# Patient Record
Sex: FEMALE | Race: ASIAN | NOT HISPANIC OR LATINO | Employment: FULL TIME | URBAN - METROPOLITAN AREA
[De-identification: names, ages, dates, MRNs, and addresses within clinical notes are randomized per-mention and may not be internally consistent; named-entity substitution may affect disease eponyms.]

---

## 2017-05-17 ENCOUNTER — HOSPITAL ENCOUNTER (OUTPATIENT)
Dept: RADIOLOGY | Facility: MEDICAL CENTER | Age: 42
End: 2017-05-17
Attending: SPECIALIST
Payer: COMMERCIAL

## 2017-05-17 DIAGNOSIS — E04.9 ENLARGEMENT OF THYROID: ICD-10-CM

## 2017-05-17 PROCEDURE — 76536 US EXAM OF HEAD AND NECK: CPT

## 2017-06-19 ENCOUNTER — HOSPITAL ENCOUNTER (OUTPATIENT)
Dept: RADIOLOGY | Facility: MEDICAL CENTER | Age: 42
End: 2017-06-19
Attending: SPECIALIST
Payer: COMMERCIAL

## 2017-06-19 DIAGNOSIS — Z12.31 ENCOUNTER FOR MAMMOGRAM TO ESTABLISH BASELINE MAMMOGRAM: ICD-10-CM

## 2017-06-19 PROCEDURE — 77063 BREAST TOMOSYNTHESIS BI: CPT

## 2017-09-28 ENCOUNTER — OFFICE VISIT (OUTPATIENT)
Dept: MEDICAL GROUP | Facility: MEDICAL CENTER | Age: 42
End: 2017-09-28
Payer: COMMERCIAL

## 2017-09-28 ENCOUNTER — PATIENT MESSAGE (OUTPATIENT)
Dept: MEDICAL GROUP | Facility: MEDICAL CENTER | Age: 42
End: 2017-09-28

## 2017-09-28 VITALS
SYSTOLIC BLOOD PRESSURE: 120 MMHG | HEART RATE: 77 BPM | RESPIRATION RATE: 14 BRPM | BODY MASS INDEX: 26.22 KG/M2 | DIASTOLIC BLOOD PRESSURE: 80 MMHG | OXYGEN SATURATION: 98 % | TEMPERATURE: 97.2 F | HEIGHT: 66 IN | WEIGHT: 163.14 LBS

## 2017-09-28 DIAGNOSIS — F41.1 GENERALIZED ANXIETY DISORDER: ICD-10-CM

## 2017-09-28 DIAGNOSIS — M25.511 CHRONIC PAIN OF BOTH SHOULDERS: ICD-10-CM

## 2017-09-28 DIAGNOSIS — G89.29 CHRONIC PAIN OF BOTH SHOULDERS: ICD-10-CM

## 2017-09-28 DIAGNOSIS — R22.0 LUMP ON FACE: ICD-10-CM

## 2017-09-28 DIAGNOSIS — J45.20 MILD INTERMITTENT ASTHMA WITHOUT COMPLICATION: ICD-10-CM

## 2017-09-28 DIAGNOSIS — E78.00 HYPERCHOLESTEROLEMIA: ICD-10-CM

## 2017-09-28 DIAGNOSIS — M25.512 CHRONIC PAIN OF BOTH SHOULDERS: ICD-10-CM

## 2017-09-28 PROCEDURE — 99214 OFFICE O/P EST MOD 30 MIN: CPT | Performed by: FAMILY MEDICINE

## 2017-09-28 RX ORDER — LORAZEPAM 0.5 MG/1
0.5 TABLET ORAL
Qty: 15 TAB | Refills: 0 | Status: SHIPPED | OUTPATIENT
Start: 2017-09-28 | End: 2018-10-01 | Stop reason: SDUPTHER

## 2017-09-28 RX ORDER — FLUOXETINE 20 MG/1
20 TABLET, FILM COATED ORAL DAILY
Qty: 90 TAB | Refills: 3 | Status: SHIPPED | OUTPATIENT
Start: 2017-09-28 | End: 2018-10-01 | Stop reason: SDUPTHER

## 2017-09-28 RX ORDER — ALBUTEROL SULFATE 90 UG/1
2 AEROSOL, METERED RESPIRATORY (INHALATION) EVERY 6 HOURS PRN
Qty: 8.5 G | Refills: 3 | Status: SHIPPED | OUTPATIENT
Start: 2017-09-28 | End: 2021-06-28

## 2017-09-28 ASSESSMENT — PATIENT HEALTH QUESTIONNAIRE - PHQ9: CLINICAL INTERPRETATION OF PHQ2 SCORE: 0

## 2017-09-28 NOTE — ASSESSMENT & PLAN NOTE
This is a chronic health problem for this patient in that she has a history of elevated cholesterol the part of the reason her cholesterol is elevated she has an excellent HDL cholesterol. Her most recent blood work which was done 5/18/17 shows a total cholesterol of 259, tragus rate 72, HDL 87 and her LDL is calculated at 158. Her VLDL is in the normal range of 14. We can get further testing to actually directly measure her LDL cholesterol to determine whether or not she truly needs medication. Patient is working on diet and exercise and would prefer not going on medication. She has convinced herself that she actually is having problems with cholesterol because of having to be back on fluoxetine.

## 2017-09-28 NOTE — ASSESSMENT & PLAN NOTE
This is a chronic health problem for this patient that actually controlled with her being on fluoxetine. She has a history of depression in the past which was treated with the fluoxetine and then she came off the medication. She is doing well now as far as panic attacks being controlled while she is on fluoxetine. She does occasionally use a small portion of a dose of 0.5 mg lorazepam to try and calm herself when she is having excessive right hip pain that then causes her to become anxious. She will be given a prescription for 15 tablets for the coming year. She had a prescription for a similar number in the last year.I did consult the  report which is consistent with her use and no other opiates or the presence are being prescribed.

## 2017-09-28 NOTE — ASSESSMENT & PLAN NOTE
This is a lump that was found by the patient about the beginning of July when she was rubbing her face and rubbing her eyes she noted there was a lump at the nasal bridge on the right-hand side just inside the eye socket itself. It's nontender if she's not touching it. She does not believe it's changed in size over that time. She cannot recall ever finding it previously. She is concerned because she has a history of breast cancer in the family and finding new lumps are upsetting. Patient also shares that she has a sense or feeling of fullness behind her right eye. She states that's been present for 2 weeks. She does not know if it's associated with the lesion under the skin on her face. It is on the same side. We will do a CT of the sinuses to evaluate the entire area. We will notify her of results and have her come in to discuss.

## 2017-09-28 NOTE — ASSESSMENT & PLAN NOTE
This is chronic health problem well controlled with current medications. She occasionally will have to use her albuterol inhalers. She does not take a regular steroid inhaler. Sounds like her symptomatology is fairly simple and she will let us know if she's having problems.

## 2017-09-29 NOTE — PROGRESS NOTES
CC: Discuss chronic health problems listed below.    HISTORY OF PRESENT ILLNESS: Patient is a 42 y.o. female established patient who presents today to [facility] care previously having seen Dr. Kelly Tadeo. She would like to discuss her problems listed below.    Health Maintenance: Completed    Lump on face  This is a lump that was found by the patient about the beginning of July when she was rubbing her face and rubbing her eyes she noted there was a lump at the nasal bridge on the right-hand side just inside the eye socket itself. It's nontender if she's not touching it. She does not believe it's changed in size over that time. She cannot recall ever finding it previously. She is concerned because she has a history of breast cancer in the family and finding new lumps are upsetting. Patient also shares that she has a sense or feeling of fullness behind her right eye. She states that's been present for 2 weeks. She does not know if it's associated with the lesion under the skin on her face. It is on the same side. We will do a CT of the sinuses to evaluate the entire area. We will notify her of results and have her come in to discuss.    Mild intermittent asthma without complication  This is chronic health problem well controlled with current medications. She occasionally will have to use her albuterol inhalers. She does not take a regular steroid inhaler. Sounds like her symptomatology is fairly simple and she will let us know if she's having problems.    Hypercholesterolemia  This is a chronic health problem for this patient in that she has a history of elevated cholesterol the part of the reason her cholesterol is elevated she has an excellent HDL cholesterol. Her most recent blood work which was done 5/18/17 shows a total cholesterol of 259, tragus rate 72, HDL 87 and her LDL is calculated at 158. Her VLDL is in the normal range of 14. We can get further testing to actually directly measure her LDL cholesterol  to determine whether or not she truly needs medication. Patient is working on diet and exercise and would prefer not going on medication. She has convinced herself that she actually is having problems with cholesterol because of having to be back on fluoxetine.    Generalized anxiety disorder   This is a chronic health problem for this patient that actually controlled with her being on fluoxetine. She has a history of depression in the past which was treated with the fluoxetine and then she came off the medication. She is doing well now as far as panic attacks being controlled while she is on fluoxetine. She does occasionally use a small portion of a dose of 0.5 mg lorazepam to try and calm herself when she is having excessive right hip pain that then causes her to become anxious. She will be given a prescription for 15 tablets for the coming year. She had a prescription for a similar number in the last year.I did consult the  report which is consistent with her use and no other opiates or the presence are being prescribed.    Chronic pain of both shoulders  This is a new complaint for the patient. She states that about mid July she was working out doing fairly heavy weight lifting. Because of her hip dysplasia she tries to do regular weight workouts but does not get as much aerobic activity. She notes that since mid July she's having problems with pain in both of her shoulders but particularly of the air movements in her right shoulder that will cause her right arm to experience numbness and radicular pain. She would like to see sports medicine.      Patient Active Problem List    Diagnosis Date Noted   • Lump on face 09/28/2017   • Chronic pain of both shoulders 09/28/2017   • Hypercholesterolemia 10/16/2016   • Eczema 03/17/2014   • Generalized anxiety disorder    • Mild intermittent asthma without complication    • HIP DYSPLASIA, CONGENITAL 08/09/2010      Allergies:Asa [aspirin]; Neomycin-polymixin b  [neomycin-polymyxin b]; and Omnipaque [iohexol]    Current Outpatient Prescriptions   Medication Sig Dispense Refill   • fluoxetine (PROZAC) 20 MG tablet Take 1 Tab by mouth every day. 90 Tab 3   • albuterol 108 (90 Base) MCG/ACT Aero Soln inhalation aerosol Inhale 2 Puffs by mouth every 6 hours as needed for Shortness of Breath. 8.5 g 3   • lorazepam (ATIVAN) 0.5 MG Tab Take 1 Tab by mouth 1 time daily as needed for Anxiety. 15 Tab 0   • asa/apap/caffeine (EXCEDRIN) 250-250-65 MG TABS Take 1 Tab by mouth every 6 hours as needed.         No current facility-administered medications for this visit.        Social History   Substance Use Topics   • Smoking status: Never Smoker   • Smokeless tobacco: Never Used   • Alcohol use No     Social History     Social History Narrative   • No narrative on file       Family History   Problem Relation Age of Onset   • Cancer Mother 51     Breast    • Arthritis Mother      DJD   • Hyperlipidemia Mother    • Cancer Maternal Grandfather 70     prostate   • Stroke Paternal Grandmother      80   • Cancer Paternal Grandmother      Breast, pancreas, bone   • Diabetes Paternal Grandmother      Surgically induced.   • Arthritis Paternal Grandmother      RA   • Heart Disease Paternal Grandfather      MI    • Other Paternal Grandfather      hepatitis - complication   • Hyperlipidemia Father    • Other Maternal Grandmother      old age   • Hyperlipidemia Brother    • Diabetes Paternal Uncle    • Alcohol abuse Paternal Uncle        Review of Systems:      - Constitutional: Negative for fever, chills, unexpected weight change, and fatigue/generalized weakness.     - HEENT: Negative for headaches, vision changes, hearing changes, ear pain, ear discharge, rhinorrhea, sinus congestion, sore throat, and neck pain.      - Respiratory: Negative for cough, sputum production, chest congestion, dyspnea, wheezing, and crackles.      - Cardiovascular: Negative for chest pain, palpitations, orthopnea, and  "bilateral lower extremity edema.     - Gastrointestinal: Negative for heartburn, nausea, vomiting, abdominal pain, hematochezia, melena, diarrhea, constipation, and greasy/foul-smelling stools.     - Genitourinary: Negative for dysuria, polyuria, hematuria, pyuria, urinary urgency, and urinary incontinence.    - Musculoskeletal:Positive for chronic right hip pain dependent upon activity, bilateral shoulder pain as in history of present illness    - Skin: Negative for rash, itching, cyanotic skin color change.     - Neurological: Negative for dizziness, tingling, tremors, focal sensory deficit, focal weakness and headaches.     - Endo/Heme/Allergies: Does not bruise/bleed easily.     - Psychiatric/Behavioral: Negative for depression, suicidal/homicidal ideation and memory loss.        - NOTE: All other systems reviewed and are negative, except as in HPI.      Exam:    Blood pressure 120/80, pulse 77, temperature 36.2 °C (97.2 °F), resp. rate 14, height 1.676 m (5' 6\"), weight 74 kg (163 lb 2.3 oz), last menstrual period 08/29/2017, SpO2 98 %, not currently breastfeeding.  General:  Well nourished, well developed female in NAD  HEENT: Eyes conjunctiva is clear, lids without ptosis, pupils equal round and reactive to light and accommodation.  Ears normal shape and contour, canals are clear bilaterally, TMs with good light reflex and appear normal.  Nasal mucosa pale and edematous with clear rhinorrhea.  Oropharynx benign.  Sinuses (frontal and maxillary) nontender to palpation.  There is a small cystic structure present laterally to the right of the glabella nonmobile and nontender  Head is grossly normal.  Neck: Supple without JVD or bruit. Thyroid is not enlarged.  Pulmonary: Clear to ausculation and percussion.  Normal effort. No rales, ronchi, or wheezing.  Cardiovascular: Regular rate and rhythm without murmur. Carotid and radial pulses are intact and equal bilaterally.  Extremities: no clubbing, cyanosis, or " edema.    Please note that this dictation was created using voice recognition software. I have made every reasonable attempt to correct obvious errors, but I expect that there are errors of grammar and possibly content that I did not discover before finalizing the note.    Assessment/Plan:  1. Lump on face  I'm uncertain as to what this visit may be some type of a brachial cleft cyst, we will get CT of the sinuses to identify  - CT-LOCALIZATION LIMITED SINUSES; Future    2. Generalized anxiety disorder  Controlled with current medications. Patient needing refills  - fluoxetine (PROZAC) 20 MG tablet; Take 1 Tab by mouth every day.  Dispense: 90 Tab; Refill: 3  - lorazepam (ATIVAN) 0.5 MG Tab; Take 1 Tab by mouth 1 time daily as needed for Anxiety.  Dispense: 15 Tab; Refill: 0    3. Mild intermittent asthma without complication  Controlled with current medications  - albuterol 108 (90 Base) MCG/ACT Aero Soln inhalation aerosol; Inhale 2 Puffs by mouth every 6 hours as needed for Shortness of Breath.  Dispense: 8.5 g; Refill: 3    4. Hypercholesterolemia  Uncontrolled, we will get a new blood test to look at her direct LDL and determine how to best treat her elevated cholesterol.      5. Chronic pain of both shoulders  We'll referred to sports medicine

## 2017-09-29 NOTE — ASSESSMENT & PLAN NOTE
This is a new complaint for the patient. She states that about mid July she was working out doing fairly heavy weight lifting. Because of her hip dysplasia she tries to do regular weight workouts but does not get as much aerobic activity. She notes that since mid July she's having problems with pain in both of her shoulders but particularly of the air movements in her right shoulder that will cause her right arm to experience numbness and radicular pain. She would like to see sports medicine.

## 2017-10-06 ENCOUNTER — PATIENT MESSAGE (OUTPATIENT)
Dept: MEDICAL GROUP | Facility: MEDICAL CENTER | Age: 42
End: 2017-10-06

## 2017-10-06 NOTE — TELEPHONE ENCOUNTER
From: Alecia Dejesus  To: Clarissa Butt M.D.  Sent: 10/6/2017 9:02 AM PDT  Subject: RE:Cholesterol test for you    Good morning, Dr. Butt,    I had to cancel my blood test for tomorrow morning because I just learned that [facility]'s lab is not covered by my insurance. [facility], on the other hand, is covered. I would like to go to [facility] [phone], [address] [place] [address]] if at all possible. Is it possible for you to submit the order to them or do I need to stop by your office to  the paperwork?    Thank you,  Alecia Dejesus  ----- Message -----  From: Clarissa Butt M.D.  Sent: 9/29/2017 12:31 PM PDT  To: Alecia Dejesus  Subject: RE:Cholesterol test for you  Good afternoon,  It is best if she fasts for at least 8 hours prior to doing the test to get the most accurate measurement.  Once a get the results I will share them with you.  Thank you,  Dr. YE    ----- Message -----   From: Alecia Dejesus   Sent: 9/29/2017 8:24 AM PDT   To: Clarissa Butt M.D.  Subject: RE:Cholesterol test for you    Good morning, Dr. Romo,    Great! I will take care of this within the next couple of weeks. Do I need to fast for this?    Thank you,  Alecia  ----- Message -----  From: Clarissa Butt M.D.  Sent: 9/28/2017 6:47 PM PDT  To: Alecia Dejesus  Subject: Cholesterol test for you  Lucian Alston,  You can [facility] to any [facility] lab and get the blood test I wanted to get for you. I now have that test in the computer and they can draw your blood and get that for you. Once I get the results I can then have you come in and we can discuss them.  Take care, Dr. Romo

## 2017-10-16 ENCOUNTER — APPOINTMENT (OUTPATIENT)
Dept: RADIOLOGY | Facility: IMAGING CENTER | Age: 42
End: 2017-10-16
Attending: FAMILY MEDICINE
Payer: COMMERCIAL

## 2017-10-16 ENCOUNTER — APPOINTMENT (OUTPATIENT)
Dept: RADIOLOGY | Facility: MEDICAL CENTER | Age: 42
End: 2017-10-16
Attending: FAMILY MEDICINE
Payer: COMMERCIAL

## 2017-10-16 ENCOUNTER — OFFICE VISIT (OUTPATIENT)
Dept: MEDICAL GROUP | Facility: CLINIC | Age: 42
End: 2017-10-16
Payer: COMMERCIAL

## 2017-10-16 VITALS
HEIGHT: 66 IN | HEART RATE: 90 BPM | TEMPERATURE: 98.1 F | RESPIRATION RATE: 18 BRPM | SYSTOLIC BLOOD PRESSURE: 118 MMHG | BODY MASS INDEX: 26.22 KG/M2 | DIASTOLIC BLOOD PRESSURE: 76 MMHG | WEIGHT: 163.14 LBS | OXYGEN SATURATION: 94 %

## 2017-10-16 DIAGNOSIS — M54.12 CERVICAL RADICULOPATHY: ICD-10-CM

## 2017-10-16 DIAGNOSIS — G89.29 CHRONIC RIGHT SHOULDER PAIN: ICD-10-CM

## 2017-10-16 DIAGNOSIS — M25.511 CHRONIC RIGHT SHOULDER PAIN: ICD-10-CM

## 2017-10-16 PROCEDURE — 99203 OFFICE O/P NEW LOW 30 MIN: CPT | Performed by: FAMILY MEDICINE

## 2017-10-16 PROCEDURE — 72040 X-RAY EXAM NECK SPINE 2-3 VW: CPT | Mod: TC | Performed by: FAMILY MEDICINE

## 2017-10-16 NOTE — PROGRESS NOTES
"CHIEF COMPLAINT:  Alecia Dejesus female presenting at the request of Clarissa Butt M.D. for evaluation of Shoulderpain.     Alecia Dejesus is complaining of right shoulder and RIGHT ARM pain  present for 3 months  Pain is at the deltoid region  Quality is sharp  Pain is Non-radiating  Aggravated by movement, overhead activity, reaching back and POSITIVE night symptoms  Improved with  nothing   no prior problems with this shoulder in the past  Prior Treatments: NONE  Prior studies: X-Ray on the neck \"yrs ago\"  Medications tried for pain include: none  Mechanical Symptom history: No Locking and Popping which is painless  POSITIVE RIGHT radiculopathy to \"all her fingers\"  Worse with sitting at desk    REVIEW OF SYSTEMS  No Nausea, No Vomiting, No Chest Pain, No Shortness of Breath, No Dizziness, No Headache    PAST MEDICAL HISTORY:   History reviewed. No pertinent past medical history.    PMH:  has a past medical history of Allergy; ASTHMA; Chronic pain of both shoulders (9/28/2017); CONGENITAL HIP DYSPLASIA; Depression; Hypercholesterolemia (10/16/2016); Lump on face (9/28/2017); Migraine; Panic disorder; and PUD (peptic ulcer disease). She also has no past medical history of Addisons disease (CMS-[facility]); Adrenal disorder (CMS-HCC); Anemia; Arrhythmia; Arthritis; CATARACT; CHF (congestive heart failure) (CMS-HCC); Clotting disorder (CMS-HCC); COPD; Cushings syndrome (CMS-HCC); Diabetes; EMPHYSEMA; GERD (gastroesophageal reflux disease); Glaucoma; Goiter; Heart attack; Heart murmur; HIV (human immunodeficiency virus infection); Hypertension; IBD (inflammatory bowel disease); Kidney disease; Meningitis; Muscle disorder; OSTEOPOROSIS; Parathyroid disorder (CMS-[facility]); Pituitary disease (CMS-HCC); Seizure (CMS-HCC); Stroke (CMS-HCC); Substance abuse; Thyroid disease; Tuberculosis; or Urinary tract infection, site not specified.  MEDS:   Current Outpatient Prescriptions:   •  fluoxetine (PROZAC) 20 MG tablet, Take 1 " "Tab by mouth every day., Disp: 90 Tab, Rfl: 3  •  albuterol 108 (90 Base) MCG/ACT Aero Soln inhalation aerosol, Inhale 2 Puffs by mouth every 6 hours as needed for Shortness of Breath., Disp: 8.5 g, Rfl: 3  •  lorazepam (ATIVAN) 0.5 MG Tab, Take 1 Tab by mouth 1 time daily as needed for Anxiety., Disp: 15 Tab, Rfl: 0  •  asa/apap/caffeine (EXCEDRIN) 250-250-65 MG TABS, Take 1 Tab by mouth every 6 hours as needed.  , Disp: , Rfl:   ALLERGIES:   Allergies   Allergen Reactions   • Asa [Aspirin] Rash     Causes asthma attack   • Neomycin-Polymixin B [Neomycin-Polymyxin B] Rash   • Omnipaque [Iohexol] Rash and Nausea     SURGHX:   Past Surgical History:   Procedure Laterality Date   • HARDWARE REMOVAL ORTHO  2006    right   • ACETABULAR OSTEOTOMY  2005    right   • SALPINGO-OOPHORECTOMY, UNILATERAL  2004    dermoid   • POLYDACTYLY CORRECTION  1980     SOCHX:  reports that she has never smoked. She has never used smokeless tobacco. She reports that she does not drink alcohol or use drugs.  FH: Family history was reviewed, no pertinent findings to report     PHYSICAL EXAM:  /76   Pulse 90   Temp 36.7 °C (98.1 °F)   Resp 18   Ht 1.676 m (5' 6\")   Wt 74 kg (163 lb 2.3 oz)   SpO2 94%   BMI 26.33 kg/m²       well-developed, well-nourished in no apparent distress, alert and oriented x 3.  Gait: normal    Cervical spine:  Range of motion Slightly limited with Lateral rotation  Spurling's testing is POSITIVE with radicular symptoms down the arm past the elbow on the RIGHT in a c6 dermatomal pattern  Cervical spine tenderness POSITIVE at the level of C5    Strength testing:     Deltoid, Bilateral -  5/5  Bicep, Bilateral -  5/5  Tricep, Bilateral -  5/5  Wrist Extension, Bilateral -  5/5  Wrist Flexion, Bilateral -  5/5  Finger Abduction, Bilateral -  5/5    Sensation:  INTACT Bilaterally        Reflexes:   Biceps: R 3+/L  3+  Triceps: R 3+/L  3+  Brachial radialis R 3+/L  3+  Toussaint's testing is Negative " Bilaterally  The arms are otherwise neurovascularly intact     Shoulder Exam:    RIGHT Shoulder:  No visible swelling   Range of motion INTACT  Tenderness: Non-tender  Empty Can Testing Right -  5/5  Internal Rotation Right -  5/5  External Rotation Right -  5/5  Lift Off Testing Right -  5/5  Impingement testing Villatoro  Negative  Neer's testing Negative  Apprehension testing POSITIVE  Relocation testing Negative  Mcguire's Testing POSITIVE  Grind Testing Negative      LEFT Shoulder:  No visible swelling   Range of motion INTACT  Tenderness: Non-tender  Empty Can Testing Left -  5/5  Internal Rotation Left -  5/5  External Rotation Left -  5/5  Lift Off Testing Left -  5/5  Impingement testing Villatoro  Negative  Neer's testing Negative  Apprehension testing POSITIVE  Relocation testing Negative  Mcguire's Testing Negative  Grind Testing Negative    Additional Findings: Flexed Posture      1. Chronic right shoulder pain     2. Cervical radiculopathy  DX-CERVICAL SPINE-2 OR 3 VIEWS    REFERRAL TO PHYSICAL THERAPY Reason for Therapy: Eval/Treat/Report     Long history of right hip issues/congenital, postoperative  POSITIVE right C5-C6 cervical radiculopathy  Likely contributing to her shoulder pain symptoms  Reproducible in the office today  Referral for formal physical therapy for postural training and home exercise program    Should she fail formal physical therapy, consider MRI of the cervical spine for evaluation of her radicular symptoms    Return in about 6 weeks (around 11/27/2017). To see how she is doing formal physical therapy    10/16/2017 2:18 PM    HISTORY/REASON FOR EXAM:  Atraumatic Pain.  Neck pain rating down RIGHT arm.    TECHNIQUE/EXAM DESCRIPTION AND NUMBER OF VIEWS:  Cervical spine series, 3 views.    COMPARISON:  None.      FINDINGS:  Vertebral alignment is preserved.  Mild loss of disc height at the C3-4 C4-5 and C5-6 levels.  Prevertebral soft tissues are within normal limits.  Vertebral body  "heights are preserved.  The facet joints show normal alignment.  Odontoid and lateral masses of C1 are intact.   Impression       1.  Mild degenerative change of cervical spine.  2.  No fracture or subluxation.       taken here and reviewed by me    Thank you Clarissa Butt M.D. for allowing me to participate in caring for your patient.        ADDENDUM:  10/17/17    1. Chronic right shoulder pain     2. Cervical radiculopathy  DX-CERVICAL SPINE-2 OR 3 VIEWS    REFERRAL TO PHYSICAL THERAPY Reason for Therapy: Eval/Treat/Report    CANCELED: REFERRAL TO PHYSICAL THERAPY Reason for Therapy: Eval/Treat/Report     Redirected PT order for insurance reasons as requested by patient via \"[employer] message\"  "

## 2017-10-23 ENCOUNTER — HOSPITAL ENCOUNTER (OUTPATIENT)
Dept: RADIOLOGY | Facility: MEDICAL CENTER | Age: 42
End: 2017-10-23
Attending: FAMILY MEDICINE
Payer: COMMERCIAL

## 2017-10-23 DIAGNOSIS — R22.0 LUMP ON FACE: ICD-10-CM

## 2017-10-23 PROCEDURE — 70486 CT MAXILLOFACIAL W/O DYE: CPT

## 2017-10-24 ENCOUNTER — PATIENT MESSAGE (OUTPATIENT)
Dept: MEDICAL GROUP | Facility: MEDICAL CENTER | Age: 42
End: 2017-10-24

## 2017-10-24 NOTE — TELEPHONE ENCOUNTER
From: Alecia Dejesus  To: Clarissa Butt M.D.  Sent: 10/24/2017 11:29 AM PDT  Subject: Test Result Question    Hello Dr. Butt,    Thank you for reviewing and commenting on my CT scan result. I'm relieved there aren't any issues to be concerned about and that it's just a fat clump. Knowing that the lump isn't an issue, I would like to find out why my vision in my right eye is blurry. Would you recommend an optometrist, ophthalmologist, or someone else? Do you have anyone specific that you'd highly recommend?    Thank you again,  Alecia

## 2017-10-26 LAB
CHOLEST SERPL-MCNC: 250 MG/DL (ref 100–199)
HDL SERPL-SCNC: 35.3 UMOL/L
HDLC SERPL-MCNC: 86 MG/DL
LDL SERPL QN: 21.7 NM
LDL SERPL-SCNC: 1178 NMOL/L
LDL SMALL SERPL-SCNC: <90 NMOL/L
LDLC SERPL CALC-MCNC: 152 MG/DL (ref 0–99)
LP-IR SCORE SERPL: <25
TRIGL SERPL-MCNC: 60 MG/DL (ref 0–149)

## 2018-07-19 ENCOUNTER — HOSPITAL ENCOUNTER (OUTPATIENT)
Dept: RADIOLOGY | Facility: MEDICAL CENTER | Age: 43
End: 2018-07-19
Attending: OTOLARYNGOLOGY
Payer: COMMERCIAL

## 2018-07-19 DIAGNOSIS — D23.30: ICD-10-CM

## 2018-07-19 PROCEDURE — 70486 CT MAXILLOFACIAL W/O DYE: CPT

## 2018-10-01 ENCOUNTER — OFFICE VISIT (OUTPATIENT)
Dept: MEDICAL GROUP | Facility: MEDICAL CENTER | Age: 43
End: 2018-10-01
Payer: COMMERCIAL

## 2018-10-01 VITALS
SYSTOLIC BLOOD PRESSURE: 118 MMHG | DIASTOLIC BLOOD PRESSURE: 68 MMHG | BODY MASS INDEX: 25.65 KG/M2 | TEMPERATURE: 98.8 F | HEART RATE: 84 BPM | RESPIRATION RATE: 14 BRPM | HEIGHT: 66 IN | WEIGHT: 159.6 LBS | OXYGEN SATURATION: 95 %

## 2018-10-01 DIAGNOSIS — Z00.00 WELLNESS EXAMINATION: ICD-10-CM

## 2018-10-01 DIAGNOSIS — E78.00 HYPERCHOLESTEROLEMIA: ICD-10-CM

## 2018-10-01 DIAGNOSIS — F41.1 GENERALIZED ANXIETY DISORDER: ICD-10-CM

## 2018-10-01 DIAGNOSIS — L20.84 INTRINSIC ECZEMA: ICD-10-CM

## 2018-10-01 DIAGNOSIS — Q65.89 CONGENITAL HIP DYSPLASIA: ICD-10-CM

## 2018-10-01 DIAGNOSIS — J45.20 MILD INTERMITTENT ASTHMA WITHOUT COMPLICATION: ICD-10-CM

## 2018-10-01 DIAGNOSIS — R22.0 LUMP ON FACE: ICD-10-CM

## 2018-10-01 PROBLEM — M25.511 CHRONIC PAIN OF BOTH SHOULDERS: Status: RESOLVED | Noted: 2017-09-28 | Resolved: 2018-10-01

## 2018-10-01 PROBLEM — M25.512 CHRONIC PAIN OF BOTH SHOULDERS: Status: RESOLVED | Noted: 2017-09-28 | Resolved: 2018-10-01

## 2018-10-01 PROBLEM — G89.29 CHRONIC PAIN OF BOTH SHOULDERS: Status: RESOLVED | Noted: 2017-09-28 | Resolved: 2018-10-01

## 2018-10-01 PROCEDURE — 99396 PREV VISIT EST AGE 40-64: CPT | Performed by: FAMILY MEDICINE

## 2018-10-01 RX ORDER — LORAZEPAM 0.5 MG/1
0.5 TABLET ORAL
Qty: 15 TAB | Refills: 0 | Status: SHIPPED | OUTPATIENT
Start: 2018-10-01 | End: 2018-12-30

## 2018-10-01 RX ORDER — FLUOXETINE 20 MG/1
20 TABLET, FILM COATED ORAL DAILY
Qty: 90 TAB | Refills: 3 | Status: SHIPPED | OUTPATIENT
Start: 2018-10-01 | End: 2019-09-30 | Stop reason: SDUPTHER

## 2018-10-01 ASSESSMENT — PATIENT HEALTH QUESTIONNAIRE - PHQ9: CLINICAL INTERPRETATION OF PHQ2 SCORE: 0

## 2018-10-01 NOTE — ASSESSMENT & PLAN NOTE
This is a chronic problem that the patient can tell typically will worsen times where she has to take more Advil when she is having worsening migraines or when she has been on her fluoxetine at 20 mg consistently.  She then can adjust her medication doses and take care of the eczema.  She does have some triamcinolone at home that she would like to use up before we write a new prescription.  She only uses that when she has a major flare.

## 2018-10-01 NOTE — PROGRESS NOTES
CC:Diagnoses of Generalized anxiety disorder, Congenital hip dysplasia, Intrinsic eczema, Hypercholesterolemia, Lump on face, Mild intermittent asthma without complication, and Wellness examination were pertinent to this visit.    HISTORY OF PRESENT ILLNESS: Patient is a 43 y.o. female established patient who presents today to have an annual wellness exam as well as to discuss her chronic health problems.  Patient needs refills of medications but is not having any particular problems at this time.  She lives with chronic right hip pain but can affect multiple joints when she starts having problems.  For the most part the patient feels that she is doing very well.  In looking back over her lab work she did a full lipid panel with all the different sized lipid molecules and she does not need that again for 5 years but it would like to get a comprehensive metabolic panel and a CBC because she has not had those since 2015.  We discussed that according to [facility] and health maintenance she is due for a mammogram.  She would like to do a mammogram every other year in her 40s and then start doing them every year in her 50s.  I am in agreement with that it follows the [place] [facility] recommendations.    Health Maintenance: Completed    Congenital hip dysplasia  This is a chronic health problem for this patient that is better since she had her surgery in 2013 at [facility] but she still gets a fair amount of pain.  The hip does stay in the socket which is a big improvement.    Eczema  This is a chronic problem that the patient can tell typically will worsen times where she has to take more Advil when she is having worsening migraines or when she has been on her fluoxetine at 20 mg consistently.  She then can adjust her medication doses and take care of the eczema.  She does have some triamcinolone at home that she would like to use up before we write a new prescription.  She only uses that when she has a major flare.    Generalized  anxiety disorder  This is a chronic health problem for this patient that she is able to manage with taking fluoxetine on a daily basis at 20 mg and then utilizes lorazepam 0.5 mg 1/2-1/4 of a dose as needed for panic attacks.  That usually is brought on times where her right hip is giving her problems and then usually the whole right side of her body ends up starting to have muscle spasms and that can make her extremely stressed.  She is requesting another refill of 15 tablets of her lorazepam which we did last year at the end of September.  This will get her through an entire year.  We will also renew her fluoxetine.  Obtained and reviewed patient utilization report from [facility] [place] pharmacy database on 10/1/2018 1:23 PM  prior to writing prescription for controlled substance II, III or IV per [place] bill . Based on assessment of the report,my physical exam if necessary and the patient's health problem, the prescription is medically necessary.       Hypercholesterolemia  This is a chronic health problem for this patient where she has excellent HDL at 86.  We did a full lipid panel looking at her various LDL molecules and her LDL molecules are the fluffy kind that do not cause heart disease.  We will plan to recheck this at age 47.    Lump on face  Patient has now done to CT sinuses which show that it is stable and not changing.  After seeing an optometrist, ophthalmologist, ENT the thought is is that it may be a bone spur coming off the skull there.  Nothing to be worried about.    Mild intermittent asthma without complication  This is a chronic health problem actually very well controlled with current medications.      Patient Active Problem List    Diagnosis Date Noted   • Wellness examination 10/01/2018   • Lump on face 09/28/2017   • Hypercholesterolemia 10/16/2016   • Eczema 03/17/2014   • Generalized anxiety disorder    • Mild intermittent asthma without complication    • Congenital hip dysplasia  08/09/2010      Allergies:Asa [aspirin]; Neomycin-polymixin b [neomycin-polymyxin b]; and Omnipaque [iohexol]    Current Outpatient Prescriptions   Medication Sig Dispense Refill   • fluoxetine (PROZAC) 20 MG tablet Take 1 Tab by mouth every day. 90 Tab 3   • LORazepam (ATIVAN) 0.5 MG Tab Take 1 Tab by mouth 1 time daily as needed for Anxiety for up to 90 days. 15 Tab 0   • albuterol 108 (90 Base) MCG/ACT Aero Soln inhalation aerosol Inhale 2 Puffs by mouth every 6 hours as needed for Shortness of Breath. 8.5 g 3   • asa/apap/caffeine (EXCEDRIN) 250-250-65 MG TABS Take 1 Tab by mouth every 6 hours as needed.         No current facility-administered medications for this visit.        Social History   Substance Use Topics   • Smoking status: Never Smoker   • Smokeless tobacco: Never Used   • Alcohol use No     Social History     Social History Narrative   • No narrative on file       Family History   Problem Relation Age of Onset   • Cancer Mother 51        Breast    • Arthritis Mother         DJD   • Hyperlipidemia Mother    • Cancer Maternal Grandfather 70        prostate   • Stroke Paternal Grandmother         80   • Cancer Paternal Grandmother         Breast, pancreas, bone   • Diabetes Paternal Grandmother         Surgically induced.   • Arthritis Paternal Grandmother         RA   • Heart Disease Paternal Grandfather         MI    • Other Paternal Grandfather         hepatitis - complication   • Hyperlipidemia Father    • Other Maternal Grandmother         old age   • Hyperlipidemia Brother    • Diabetes Paternal Uncle    • Alcohol abuse Paternal Uncle         ROS:     - Constitutional:  Negative for fever, chills, unexpected weight change, and fatigue/generalized weakness.    - HEENT:  Negative for headaches, vision changes, hearing changes, ear pain, ear discharge, rhinorrhea, sinus congestion, sore throat, and neck pain.      - Respiratory: Negative for cough, sputum production, chest congestion, dyspnea,  "wheezing, and crackles.      - Cardiovascular: Negative for chest pain, palpitations, orthopnea, and bilateral lower extremity edema.     - Gastrointestinal: Negative for heartburn, nausea, vomiting, abdominal pain, hematochezia, melena, diarrhea, constipation, and greasy/foul-smelling stools.     - Genitourinary: Negative for dysuria, polyuria, hematuria, pyuria, urinary urgency, and urinary incontinence.     - Musculoskeletal: Patient lives with chronic right hip pain that can cause multiple other joints to have problems.     - Skin: Negative for rash, itching, cyanotic skin color change.     - Neurological: Positive for occasional right sciatica depending on right hip pain.  Otherwise denies dizziness, tingling, tremors, focal weakness and headaches.     - Endo/Heme/Allergies: Does not bruise/bleed easily.     - Psychiatric/Behavioral: Negative for depression, suicidal/homicidal ideation and memory loss.          - NOTE: All other systems reviewed and are negative, except as in HPI.      Exam:    Blood pressure 118/68, pulse 84, temperature 37.1 °C (98.8 °F), temperature source Temporal, resp. rate 14, height 1.676 m (5' 6\"), weight 72.4 kg (159 lb 9.6 oz), last menstrual period 09/28/2018, SpO2 95 %, not currently breastfeeding. Body mass index is 25.76 kg/m².    General:  Well nourished, well developed female in NAD  Head is grossly normal.  Neck: Supple without JVD or bruit. Thyroid is not enlarged.  Pulmonary: Clear to ausculation and percussion.  Normal effort. No rales, ronchi, or wheezing.  Cardiovascular: Regular rate and rhythm without murmur. Carotid and radial pulses are intact and equal bilaterally.  Extremities: no clubbing, cyanosis, or edema.    Please note that this dictation was created using voice recognition software. I have made every reasonable attempt to correct obvious errors, but I expect that there are errors of grammar and possibly content that I did not discover before finalizing the " note.    Assessment/Plan:  1. Generalized anxiety disorder  Adequately controlled with current medications.  Patient was checked in the  program and it is appropriate for her to have a refill of her Ativan.  She is on no narcotics and no other controlled substances.  - fluoxetine (PROZAC) 20 MG tablet; Take 1 Tab by mouth every day.  Dispense: 90 Tab; Refill: 3  - LORazepam (ATIVAN) 0.5 MG Tab; Take 1 Tab by mouth 1 time daily as needed for Anxiety for up to 90 days.  Dispense: 15 Tab; Refill: 0    2. Congenital hip dysplasia  Stable and basically at her baseline and doing okay.    3. Intrinsic eczema  This problem the patient has figured out fairly well and rarely needs to utilize a triamcinolone cream.  She will let us know when she is ready for a refill.    4. Hypercholesterolemia  Controlled, continue with current meds and lifestyle.  This patient actually has a high HDL and a low LDL cholesterol with her having the fluffier form of LDL cholesterol which does not need medication management.    5. Lump on face  Stable, this has now been examined by an ophthalmologist and an ENT.  They feel it is a benign finding    6. Mild intermittent asthma without complication  Controlled, continue with current meds and lifestyle.      7. Wellness examination  Patient needing a comprehensive metabolic panel and a CBC because she has not had these done since 2015 and she is now over 42.  - COMP METABOLIC PANEL; Future  - CBC WITH DIFFERENTIAL; Future

## 2018-10-01 NOTE — LETTER
[facility]  Clarissa Butt M.D.  [address]  [place] [address]  Fax: [phone]   Authorization for Release/Disclosure of   Protected Health Information   Name: ALECIA DEJESUS : 1975 SSN: [id number]   Address:  [facility]  [place] [address] Phone:    [phone] (home)    I authorize the entity listed below to release/disclose the PHI below to:   [facility]/Clarissa Butt M.D. and Clarissa Butt M.D.   Provider or Entity Name:  Dr. Anthony Samayoa   Address   [facility], [facility], [facility]   Phone:      Fax:     Reason for request: continuity of care   Information to be released:    [  ] LAST COLONOSCOPY,  including any PATH REPORT and follow-up  [  ] LAST FIT/COLOGUARD RESULT [  ] LAST DEXA  [  ] LAST MAMMOGRAM  [  ] LAST PAP  [  ] LAST LABS [  ] RETINA EXAM REPORT  [  ] IMMUNIZATION RECORDS  [  ] Release all info      [  ] Check here and initial the line next to each item to release ALL health information INCLUDING  _____ Care and treatment for drug and / or alcohol abuse  _____ HIV testing, infection status, or AIDS  _____ Genetic Testing    DATES OF SERVICE OR TIME PERIOD TO BE DISCLOSED: _____________  I understand and acknowledge that:  * This Authorization may be revoked at any time by you in writing, except if your health information has already been used or disclosed.  * Your health information that will be used or disclosed as a result of you signing this authorization could be re-disclosed by the recipient. If this occurs, your re-disclosed health information may no longer be protected by State or Federal laws.  * You may refuse to sign this Authorization. Your refusal will not affect your ability to obtain treatment.  * This Authorization becomes effective upon signing and will  on (date) __________.      If no date is indicated, this Authorization will  one (1) year from the signature date.    Name: Alecia Dejesus    Signature:   Date:     10/1/2018            PLEASE FAX REQUESTED RECORDS BACK TO: [phone]

## 2018-10-01 NOTE — ASSESSMENT & PLAN NOTE
This is a chronic health problem for this patient that she is able to manage with taking fluoxetine on a daily basis at 20 mg and then utilizes lorazepam 0.5 mg 1/2-1/4 of a dose as needed for panic attacks.  That usually is brought on times where her right hip is giving her problems and then usually the whole right side of her body ends up starting to have muscle spasms and that can make her extremely stressed.  She is requesting another refill of 15 tablets of her lorazepam which we did last year at the end of September.  This will get her through an entire year.  We will also renew her fluoxetine.  Obtained and reviewed patient utilization report from [facility] pharmacy database on 10/1/2018 1:23 PM  prior to writing prescription for controlled substance II, III or IV per [place] bill . Based on assessment of the report,my physical exam if necessary and the patient's health problem, the prescription is medically necessary.

## 2018-10-01 NOTE — ASSESSMENT & PLAN NOTE
Patient has now done to CT sinuses which show that it is stable and not changing.  After seeing an optometrist, ophthalmologist, ENT the thought is is that it may be a bone spur coming off the skull there.  Nothing to be worried about.

## 2018-10-01 NOTE — ASSESSMENT & PLAN NOTE
This is a chronic health problem for this patient where she has excellent HDL at 86.  We did a full lipid panel looking at her various LDL molecules and her LDL molecules are the fluffy kind that do not cause heart disease.  We will plan to recheck this at age 47.

## 2018-10-01 NOTE — ASSESSMENT & PLAN NOTE
This is a chronic health problem for this patient that is better since she had her surgery in 2013 at [facility] but she still gets a fair amount of pain.  The hip does stay in the socket which is a big improvement.

## 2018-10-14 LAB
ALBUMIN SERPL-MCNC: 4.4 G/DL (ref 3.5–5.5)
ALBUMIN/GLOB SERPL: 1.8 {RATIO} (ref 1.2–2.2)
ALP SERPL-CCNC: 53 IU/L (ref 39–117)
ALT SERPL-CCNC: 16 IU/L (ref 0–32)
AST SERPL-CCNC: 14 IU/L (ref 0–40)
BASOPHILS # BLD AUTO: 0.1 X10E3/UL (ref 0–0.2)
BASOPHILS NFR BLD AUTO: 1 %
BILIRUB SERPL-MCNC: 0.3 MG/DL (ref 0–1.2)
BUN SERPL-MCNC: 15 MG/DL (ref 6–24)
BUN/CREAT SERPL: 23 (ref 9–23)
CALCIUM SERPL-MCNC: 9.4 MG/DL (ref 8.7–10.2)
CHLORIDE SERPL-SCNC: 104 MMOL/L (ref 96–106)
CO2 SERPL-SCNC: 19 MMOL/L (ref 20–29)
CREAT SERPL-MCNC: 0.66 MG/DL (ref 0.57–1)
EOSINOPHIL # BLD AUTO: 0.1 X10E3/UL (ref 0–0.4)
EOSINOPHIL NFR BLD AUTO: 2 %
ERYTHROCYTE [DISTWIDTH] IN BLOOD BY AUTOMATED COUNT: 13.2 % (ref 12.3–15.4)
GLOBULIN SER CALC-MCNC: 2.5 G/DL (ref 1.5–4.5)
GLUCOSE SERPL-MCNC: 96 MG/DL (ref 65–99)
HCT VFR BLD AUTO: 44.6 % (ref 34–46.6)
HGB BLD-MCNC: 14.7 G/DL (ref 11.1–15.9)
IF AFRICAN AMERICAN  100797: 127 ML/MIN/1.73
IF NON AFRICAN AMER 100791: 110 ML/MIN/1.73
IMM GRANULOCYTES # BLD: 0 X10E3/UL (ref 0–0.1)
IMM GRANULOCYTES NFR BLD: 0 %
IMMATURE CELLS  115398: NORMAL
LYMPHOCYTES # BLD AUTO: 2.2 X10E3/UL (ref 0.7–3.1)
LYMPHOCYTES NFR BLD AUTO: 31 %
MCH RBC QN AUTO: 30.1 PG (ref 26.6–33)
MCHC RBC AUTO-ENTMCNC: 33 G/DL (ref 31.5–35.7)
MCV RBC AUTO: 91 FL (ref 79–97)
MONOCYTES # BLD AUTO: 0.5 X10E3/UL (ref 0.1–0.9)
MONOCYTES NFR BLD AUTO: 7 %
MORPHOLOGY BLD-IMP: NORMAL
NEUTROPHILS # BLD AUTO: 4.3 X10E3/UL (ref 1.4–7)
NEUTROPHILS NFR BLD AUTO: 59 %
NRBC BLD AUTO-RTO: NORMAL %
PLATELET # BLD AUTO: 354 X10E3/UL (ref 150–379)
POTASSIUM SERPL-SCNC: 4.3 MMOL/L (ref 3.5–5.2)
PROT SERPL-MCNC: 6.9 G/DL (ref 6–8.5)
RBC # BLD AUTO: 4.88 X10E6/UL (ref 3.77–5.28)
SODIUM SERPL-SCNC: 140 MMOL/L (ref 134–144)
WBC # BLD AUTO: 7.1 X10E3/UL (ref 3.4–10.8)

## 2019-06-13 ENCOUNTER — HOSPITAL ENCOUNTER (OUTPATIENT)
Dept: RADIOLOGY | Facility: MEDICAL CENTER | Age: 44
End: 2019-06-13
Attending: SPECIALIST
Payer: COMMERCIAL

## 2019-06-13 DIAGNOSIS — E04.1 NONTOXIC UNINODULAR GOITER: ICD-10-CM

## 2019-06-13 DIAGNOSIS — Z12.31 ENCOUNTER FOR SCREENING MAMMOGRAM FOR MALIGNANT NEOPLASM OF BREAST: ICD-10-CM

## 2019-06-13 PROCEDURE — 77063 BREAST TOMOSYNTHESIS BI: CPT

## 2019-06-13 PROCEDURE — 76536 US EXAM OF HEAD AND NECK: CPT

## 2019-09-30 ENCOUNTER — OFFICE VISIT (OUTPATIENT)
Dept: MEDICAL GROUP | Facility: MEDICAL CENTER | Age: 44
End: 2019-09-30
Payer: COMMERCIAL

## 2019-09-30 VITALS
WEIGHT: 157.85 LBS | DIASTOLIC BLOOD PRESSURE: 76 MMHG | HEART RATE: 72 BPM | TEMPERATURE: 98.2 F | SYSTOLIC BLOOD PRESSURE: 110 MMHG | RESPIRATION RATE: 12 BRPM | OXYGEN SATURATION: 98 % | HEIGHT: 66 IN | BODY MASS INDEX: 25.37 KG/M2

## 2019-09-30 DIAGNOSIS — E78.00 HYPERCHOLESTEROLEMIA: ICD-10-CM

## 2019-09-30 DIAGNOSIS — F41.1 GENERALIZED ANXIETY DISORDER: ICD-10-CM

## 2019-09-30 DIAGNOSIS — J45.20 MILD INTERMITTENT ASTHMA WITHOUT COMPLICATION: ICD-10-CM

## 2019-09-30 DIAGNOSIS — R22.0 LUMP ON FACE: ICD-10-CM

## 2019-09-30 PROCEDURE — 99214 OFFICE O/P EST MOD 30 MIN: CPT | Performed by: FAMILY MEDICINE

## 2019-09-30 RX ORDER — FLUOXETINE 20 MG/1
20 TABLET, FILM COATED ORAL DAILY
Qty: 90 TAB | Refills: 3 | Status: SHIPPED | OUTPATIENT
Start: 2019-09-30 | End: 2020-07-06 | Stop reason: SDUPTHER

## 2019-09-30 ASSESSMENT — PATIENT HEALTH QUESTIONNAIRE - PHQ9: CLINICAL INTERPRETATION OF PHQ2 SCORE: 0

## 2019-09-30 NOTE — PROGRESS NOTES
CC:Diagnoses of Generalized anxiety disorder, Hypercholesterolemia, Lump on face, and Mild intermittent asthma without complication were pertinent to this visit.    HISTORY OF PRESENT ILLNESS: Patient is a 44 y.o. female established patient who presents today to talk about her chronic health problems and get refills of her medications.      Generalized anxiety disorder  This is a chronic health problem for this patient that is well controlled utilizing fluoxetine tablet rather than the capsules.  She actually has not had to use any of her lorazepam in the last year.  She is doing very well overall.  She would like to get a refill of the fluoxetine.  Try to send it for 90 days but for some reason her pharmacy has not been filling it for 90 at a time.    Lump on face  Patient ended up having CT scans about a year apart and there is been no change.  Its thought to be a bone spur in the mid canthal area just beneath the brow on the right eye.  There is no reason to proceed with any type of biopsy or surgery.    Mild intermittent asthma without complication  This is a chronic health problem well-controlled with occasional use of albuterol.  Patient does not have to use this on a regular basis.  She currently has an albuterol inhaler that is up-to-date.      Patient Active Problem List    Diagnosis Date Noted   • Wellness examination 10/01/2018   • Lump on face 09/28/2017   • Hypercholesterolemia 10/16/2016   • Eczema 03/17/2014   • Generalized anxiety disorder    • Mild intermittent asthma without complication    • Congenital hip dysplasia 08/09/2010      Allergies:Asa [aspirin]; Neomycin-polymixin b [neomycin-polymyxin b]; and Omnipaque [iohexol]    Current Outpatient Medications   Medication Sig Dispense Refill   • fluoxetine (PROZAC) 20 MG tablet Take 1 Tab by mouth every day. 90 Tab 3   • albuterol 108 (90 Base) MCG/ACT Aero Soln inhalation aerosol Inhale 2 Puffs by mouth every 6 hours as needed for Shortness of  Breath. 8.5 g 3   • asa/apap/caffeine (EXCEDRIN) 250-250-65 MG TABS Take 1 Tab by mouth every 6 hours as needed.         No current facility-administered medications for this visit.        Social History     Tobacco Use   • Smoking status: Never Smoker   • Smokeless tobacco: Never Used   Substance Use Topics   • Alcohol use: No   • Drug use: No     Social History     Social History Narrative   • Not on file       Family History   Problem Relation Age of Onset   • Cancer Mother 51        Breast    • Arthritis Mother         DJD   • Hyperlipidemia Mother    • Cancer Maternal Grandfather 70        prostate   • Stroke Paternal Grandmother         80   • Cancer Paternal Grandmother         Breast, pancreas, bone   • Diabetes Paternal Grandmother         Surgically induced.   • Arthritis Paternal Grandmother         RA   • Heart Disease Paternal Grandfather         MI    • Other Paternal Grandfather         hepatitis - complication   • Hyperlipidemia Father    • Other Maternal Grandmother         old age   • Hyperlipidemia Brother    • Diabetes Paternal Uncle    • Alcohol abuse Paternal Uncle         ROS:     - Constitutional:  Negative for fever, chills, unexpected weight change, and fatigue/generalized weakness.    - HEENT:  Negative for headaches, vision changes, hearing changes, ear pain, ear discharge, rhinorrhea, sinus congestion, sore throat, and neck pain.      - Respiratory: Negative for cough, sputum production, chest congestion, dyspnea, wheezing, and crackles.      - Cardiovascular: Negative for chest pain, palpitations, orthopnea, and bilateral lower extremity edema.     - Gastrointestinal: Negative for heartburn, nausea, vomiting, abdominal pain, hematochezia, melena, diarrhea, constipation, and greasy/foul-smelling stools.     - Genitourinary: Negative for dysuria, polyuria, hematuria, pyuria, urinary urgency, and urinary incontinence.     - Musculoskeletal: Negative for myalgias, back pain, and joint pain.  "    - Skin: Negative for rash, itching, cyanotic skin color change.     - Neurological: Negative for dizziness, tingling, tremors, focal sensory deficit, focal weakness and headaches.     - Endo/Heme/Allergies: Does not bruise/bleed easily.     - Psychiatric/Behavioral: Negative for depression, suicidal/homicidal ideation and memory loss.          - NOTE: All other systems reviewed and are negative, except as in HPI.      Exam:    /76 (BP Location: Left arm, Patient Position: Sitting, BP Cuff Size: Adult)   Pulse 72   Temp 36.8 °C (98.2 °F) (Temporal)   Resp 12   Ht 1.676 m (5' 6\")   Wt 71.6 kg (157 lb 13.6 oz)   SpO2 98%  Body mass index is 25.48 kg/m².    General:  Well nourished, well developed female in NAD  Head is grossly normal.  Neck: Supple without JVD or bruit. Thyroid is not enlarged, without palpable nodule.  Pulmonary: Clear to ausculation and percussion.  Normal effort. No rales, ronchi, or wheezing.  Cardiovascular: Regular rate and rhythm without murmur. Carotid and radial pulses are intact and equal bilaterally.  Extremities: no clubbing, cyanosis, or edema.    Please note that this dictation was created using voice recognition software. I have made every reasonable attempt to correct obvious errors, but I expect that there are errors of grammar and possibly content that I did not discover before finalizing the note.    Assessment/Plan:  1. Generalized anxiety disorder  Renewed the patient's fluoxetine tablets for the coming year giving her 90 with 3 refills  - fluoxetine (PROZAC) 20 MG tablet; Take 1 Tab by mouth every day.  Dispense: 90 Tab; Refill: 3    2. Hypercholesterolemia  Patient's cholesterol is excellent because she has an excellent HDL we will retest in 5 years.  Which would be 2023    3. Lump on face  Stable, continue to monitor    4.  Asthma  Well-controlled with use of albuterol.  Patient does not need a refill at this time.     "

## 2019-09-30 NOTE — ASSESSMENT & PLAN NOTE
This is a chronic health problem for this patient that is well controlled utilizing fluoxetine tablet rather than the capsules.  She actually has not had to use any of her lorazepam in the last year.  She is doing very well overall.  She would like to get a refill of the fluoxetine.  Try to send it for 90 days but for some reason her pharmacy has not been filling it for 90 at a time.

## 2019-09-30 NOTE — ASSESSMENT & PLAN NOTE
Patient ended up having CT scans about a year apart and there is been no change.  Its thought to be a bone spur in the mid canthal area just beneath the brow on the right eye.  There is no reason to proceed with any type of biopsy or surgery.

## 2019-09-30 NOTE — ASSESSMENT & PLAN NOTE
This is a chronic health problem well-controlled with occasional use of albuterol.  Patient does not have to use this on a regular basis.  She currently has an albuterol inhaler that is up-to-date.

## 2020-07-06 ENCOUNTER — TELEMEDICINE (OUTPATIENT)
Dept: MEDICAL GROUP | Facility: PHYSICIAN GROUP | Age: 45
End: 2020-07-06
Payer: COMMERCIAL

## 2020-07-06 VITALS — HEIGHT: 66 IN | BODY MASS INDEX: 25.48 KG/M2

## 2020-07-06 DIAGNOSIS — F41.1 GENERALIZED ANXIETY DISORDER: ICD-10-CM

## 2020-07-06 PROCEDURE — 99213 OFFICE O/P EST LOW 20 MIN: CPT | Mod: 95,CR | Performed by: FAMILY MEDICINE

## 2020-07-06 RX ORDER — FLUOXETINE 20 MG/1
20 TABLET, FILM COATED ORAL DAILY
Qty: 90 TAB | Refills: 3 | Status: SHIPPED | OUTPATIENT
Start: 2020-07-06 | End: 2021-06-28 | Stop reason: SDUPTHER

## 2020-07-06 ASSESSMENT — PATIENT HEALTH QUESTIONNAIRE - PHQ9: CLINICAL INTERPRETATION OF PHQ2 SCORE: 0

## 2020-07-06 NOTE — PROGRESS NOTES
Telemedicine Visit: Established Patient     This encounter was conducted via Zoom .   Verbal consent was obtained. Patient's identity was verified.  As a means of avoiding spread of COVID-19, this visit is being conducted by Telemedicine.         Subjective:   CC: The encounter diagnosis was Generalized anxiety disorder.    Alecia Dejesus is a 45 y.o. female presenting to talk about her generalized anxiety disorder and treatment.  Patient has done well on the fluoxetine tablets.  She would like to continue with that dosing even though she has to deal with a rash that comes occasionally.  This patient is a  at the [employer].  She is able to work from home and is social isolating because of her asthma and her mother having previously had cancer.  Patient has done well overall.      Generalized anxiety disorder  This is a chronic health problem that is well controlled with current medications and lifestyle measures.  Patient is currently utilizing the 20 mg tablet daily and does that until she gets this rash that shows up on her foot.  When that occurs she will cut her tablet down to about a quarter of a tablet and take that lower dose and then the rash usually goes away.  She will stay at that dose until she starts to feel the dip in her mood or her anxiety starting to increase and then she goes back up to the 20 mg tablet.  She finds this works well for her.  She does not mind having to modulate the dose.  We will plan to continue this for the coming year.  This year, with the Corona crisis and all the changes that we are going through.  I would encourage her to continue staying on it until we have things figured out for how to keep everyone safe or a vaccine.      ROS:     - Constitutional:  Negative for fever, chills, unexpected weight change, and fatigue/generalized weakness.    - HEENT:  Negative for headaches, vision changes, hearing changes, ear pain, ear discharge, rhinorrhea, sinus  congestion, sore throat, and neck pain.      - Respiratory: Negative for cough, sputum production, chest congestion, dyspnea, wheezing, and crackles.      - Cardiovascular: Negative for chest pain, palpitations, orthopnea, and bilateral lower extremity edema.     - Gastrointestinal: Negative for heartburn, nausea, vomiting, abdominal pain, hematochezia, melena, diarrhea, constipation, and greasy/foul-smelling stools.     - Genitourinary: Negative for dysuria, polyuria, hematuria, pyuria, urinary urgency, and urinary incontinence.     - Musculoskeletal: Negative for myalgias, back pain, and joint pain.     - Skin: Negative for rash, itching, cyanotic skin color change.     - Neurological: Negative for dizziness, tingling, tremors, focal sensory deficit, focal weakness and headaches.     - Endo/Heme/Allergies: Does not bruise/bleed easily.     - Psychiatric/Behavioral: Negative for depression, suicidal/homicidal ideation and memory loss.          - NOTE: All other systems reviewed and are negative, except as in HPI.      Patient Active Problem List    Diagnosis Date Noted   • Wellness examination 10/01/2018   • Lump on face 09/28/2017   • Hypercholesterolemia 10/16/2016   • Eczema 03/17/2014   • Generalized anxiety disorder    • Mild intermittent asthma without complication    • Congenital hip dysplasia 08/09/2010      Allergies:Asa [aspirin]; Neomycin-polymixin b [neomycin-polymyxin b]; and Omnipaque [iohexol]    Current Outpatient Medications   Medication Sig Dispense Refill   • fluoxetine (PROZAC) 20 MG tablet Take 1 Tab by mouth every day. 90 Tab 3   • albuterol 108 (90 Base) MCG/ACT Aero Soln inhalation aerosol Inhale 2 Puffs by mouth every 6 hours as needed for Shortness of Breath. 8.5 g 3   • asa/apap/caffeine (EXCEDRIN) 250-250-65 MG TABS Take 1 Tab by mouth every 6 hours as needed.         No current facility-administered medications for this visit.        Social History     Tobacco Use   • Smoking status:  "Never Smoker   • Smokeless tobacco: Never Used   Substance Use Topics   • Alcohol use: No   • Drug use: No     Social History     Social History Narrative   • Not on file       Family History   Problem Relation Age of Onset   • Cancer Mother 51        Breast    • Arthritis Mother         DJD   • Hyperlipidemia Mother    • Cancer Maternal Grandfather 70        prostate   • Stroke Paternal Grandmother         80   • Cancer Paternal Grandmother         Breast, pancreas, bone   • Diabetes Paternal Grandmother         Surgically induced.   • Arthritis Paternal Grandmother         RA   • Heart Disease Paternal Grandfather         MI    • Other Paternal Grandfather         hepatitis - complication   • Hyperlipidemia Father    • Other Maternal Grandmother         old age   • Hyperlipidemia Brother    • Diabetes Paternal Uncle    • Alcohol abuse Paternal Uncle           Objective:   Vitals obtained by patient:  Pulse: 70 and Respirations through observation: 16  Ht 1.676 m (5' 6\")   Body mass index is 25.48 kg/m².    Physical Exam:  Constitutional: Alert, no distress, well-groomed.  Skin: No rashes in visible areas.  Eye: Round. Conjunctiva clear, lids normal. No icterus.   ENMT: Lips pink without lesions, good dentition, moist mucous membranes. Phonation normal.  Neck: No masses, no thyromegaly. Moves freely without pain.  CV: Pulse as reported by patient  Respiratory: Unlabored respiratory effort, no cough or audible wheeze  Psych: Alert and oriented x3, normal affect and mood.       Assessment and Plan:   The following treatment plan was discussed:     1. Generalized anxiety disorder  Controlled with current medications.  We will write a new prescription for the coming year.  Patient will let us know if she starts having problems or needs to make any changes.  She can contact us via [employer].  - fluoxetine (PROZAC) 20 MG tablet; Take 1 Tab by mouth every day.  Dispense: 90 Tab; Refill: 3            Follow-up: prn          "

## 2020-07-06 NOTE — ASSESSMENT & PLAN NOTE
This is a chronic health problem that is well controlled with current medications and lifestyle measures.  Patient is currently utilizing the 20 mg tablet daily and does that until she gets this rash that shows up on her foot.  When that occurs she will cut her tablet down to about a quarter of a tablet and take that lower dose and then the rash usually goes away.  She will stay at that dose until she starts to feel the dip in her mood or her anxiety starting to increase and then she goes back up to the 20 mg tablet.  She finds this works well for her.  She does not mind having to modulate the dose.  We will plan to continue this for the coming year.  This year, with the Corona crisis and all the changes that we are going through.  I would encourage her to continue staying on it until we have things figured out for how to keep everyone safe or a vaccine.

## 2021-06-28 ENCOUNTER — OFFICE VISIT (OUTPATIENT)
Dept: MEDICAL GROUP | Facility: IMAGING CENTER | Age: 46
End: 2021-06-28
Payer: COMMERCIAL

## 2021-06-28 VITALS
WEIGHT: 161.4 LBS | BODY MASS INDEX: 25.94 KG/M2 | TEMPERATURE: 98.5 F | RESPIRATION RATE: 14 BRPM | OXYGEN SATURATION: 99 % | HEART RATE: 81 BPM | SYSTOLIC BLOOD PRESSURE: 118 MMHG | DIASTOLIC BLOOD PRESSURE: 72 MMHG | HEIGHT: 66 IN

## 2021-06-28 DIAGNOSIS — F41.1 GENERALIZED ANXIETY DISORDER: ICD-10-CM

## 2021-06-28 DIAGNOSIS — L20.84 INTRINSIC ECZEMA: ICD-10-CM

## 2021-06-28 DIAGNOSIS — Q65.89 CONGENITAL HIP DYSPLASIA: ICD-10-CM

## 2021-06-28 DIAGNOSIS — G43.009 MIGRAINE WITHOUT AURA AND WITHOUT STATUS MIGRAINOSUS, NOT INTRACTABLE: ICD-10-CM

## 2021-06-28 DIAGNOSIS — Z76.89 ENCOUNTER TO ESTABLISH CARE WITH NEW DOCTOR: ICD-10-CM

## 2021-06-28 PROCEDURE — 99214 OFFICE O/P EST MOD 30 MIN: CPT | Performed by: NURSE PRACTITIONER

## 2021-06-28 RX ORDER — ALCLOMETASONE DIPROPIONATE 0.5 MG/G
OINTMENT TOPICAL 2 TIMES DAILY
COMMUNITY
End: 2021-06-28 | Stop reason: SDUPTHER

## 2021-06-28 RX ORDER — ALCLOMETASONE DIPROPIONATE 0.5 MG/G
CREAM TOPICAL 2 TIMES DAILY
COMMUNITY
End: 2021-06-28

## 2021-06-28 RX ORDER — FLUOXETINE 20 MG/1
20 TABLET, FILM COATED ORAL DAILY
Qty: 90 TABLET | Refills: 3 | Status: SHIPPED | OUTPATIENT
Start: 2021-06-28

## 2021-06-28 RX ORDER — ALCLOMETASONE DIPROPIONATE 0.5 MG/G
1 OINTMENT TOPICAL 2 TIMES DAILY PRN
Qty: 45 G | Refills: 0 | Status: SHIPPED | OUTPATIENT
Start: 2021-06-28

## 2021-06-28 ASSESSMENT — PATIENT HEALTH QUESTIONNAIRE - PHQ9: CLINICAL INTERPRETATION OF PHQ2 SCORE: 0

## 2021-06-28 ASSESSMENT — PAIN SCALES - GENERAL: PAINLEVEL: NO PAIN

## 2021-06-28 NOTE — PROGRESS NOTES
Subjective:   CC: Establish Care    HISTORY OF THE PRESENT ILLNESS: Patient is a 46 y.o. female. Her prior PCP was Dr. Clarissa Butt, last seen July 2020.  Patient has history of congential hip dysplasia, anxiety, asthma, eczema, migraines, and elevated cholesterol. Patient is here today to establish care and discuss:     Anxiety  Congenital hip dysplasia  Reports that she had surgery in 2013. States overall surgery improved able to function daily. States without surgery she was on her way being in a wheelchair. States due to history of right hip dysplasia she has anxiety due to multiple surgeries needed to correct. States she takes Prozac 20 mg daily due to anxiety related to health. States she has attempted to wean medication, but has not been successful to feeling more anxious on 10 mg daily. States she does intermittently decrease her dose due to feel it contributes to her intermittent anxiety. States she will treat her eczema with ointment and wean Prozac, once rash is resolved she will increase Prozac to 20 mg again. Denies any SI/HI. Denies any concerns at this time. States she maintains regular stretching and strengthen regimen for her hip.    Depression Screening  Little interest or pleasure in doing things?  0 - not at all  Feeling down, depressed , or hopeless? 0 - not at all  Patient Health Questionnaire Score: 0    If depressive symptoms identified deferred to follow up visit unless specifically addressed in assesment and plan.    Interpretation of PHQ-9 Total Score   Score Severity   1-4 Minimal Depression   5-9 Mild Depression   10-14 Moderate Depression   15-19 Moderately Severe Depression   20-27 Severe Depression        Reports hx of migraines. States she has a history of migraines without aura about 5-6 week. States cold months are worse and commonly a trigger for her. States she manages migraines with eating apricots and soy milk. States if she eats and drinks regimen her migraines are  tolerable.      Allergies: Asa [aspirin], Neomycin-polymixin b [neomycin-polymyxin b], and Omnipaque [iohexol]    Current Outpatient Medications Ordered in Epic   Medication Sig Dispense Refill   • Acetaminophen (TYLENOL PO) Take  by mouth.     • Ibuprofen (ADVIL PO) Take  by mouth.     • alclomethasone (ACLOVATE) 0.05 % ointment Apply 1 Application topically 2 times a day as needed. Do not use longer than 10 days in a row 45 g 0   • fluoxetine (PROZAC) 20 MG tablet Take 1 tablet by mouth every day. 90 tablet 3   • asa/apap/caffeine (EXCEDRIN) 250-250-65 MG TABS Take 1 Tab by mouth every 6 hours as needed.         No current Epic-ordered facility-administered medications on file.     Past Medical History:   Diagnosis Date   • Allergy    • Chronic pain of both shoulders 9/28/2017 7/2017   • CONGENITAL HIP DYSPLASIA     right   • Depression    • Hypercholesterolemia 10/16/2016   • Lump on face 9/28/2017    Found in 7/2017   • Lump on face 9/28/2017    Found in 7/2017   • Migraine    • Panic disorder    • PUD (peptic ulcer disease)      Past Surgical History:   Procedure Laterality Date   • HARDWARE REMOVAL ORTHO  2006    right   • ACETABULAR OSTEOTOMY  2005    right   • SALPINGO-OOPHORECTOMY, UNILATERAL  2004    dermoid   • POLYDACTYLY CORRECTION  1980     Social History     Tobacco Use   • Smoking status: Never Smoker   • Smokeless tobacco: Never Used   Vaping Use   • Vaping Use: Never used   Substance Use Topics   • Alcohol use: No   • Drug use: No     Social History     Social History Narrative   • Not on file     Family History   Problem Relation Age of Onset   • Cancer Mother 51        Breast    • Arthritis Mother         DJD   • Hyperlipidemia Mother    • Cancer Maternal Grandfather 70        prostate   • Stroke Paternal Grandmother         80   • Cancer Paternal Grandmother         Breast, pancreas, bone   • Diabetes Paternal Grandmother         Surgically induced.   • Arthritis Paternal Grandmother          "RA   • Heart Disease Paternal Grandfather         MI    • Other Paternal Grandfather         hepatitis - complication   • Hyperlipidemia Father    • Other Maternal Grandmother         old age   • Hyperlipidemia Brother    • Diabetes Paternal Uncle    • Alcohol abuse Paternal Uncle      Health Maintenance: Completed.    ROS:   Constitutional: Denies fever, chills, night sweats, weight loss/gain or malaise/fatigue.   HENT: Denies nasal congestion, sore throat, hearing loss, enlarged thyroid, or difficulty swallowing.    Eyes: Denies changes in vision, pain. Denies wear corrective wear.   Respiratory: Denies cough, SOB at rest or activity.    Cardiovascular: Denies tachycardia, chest pain, palpitations, or  leg swelling.   Gastrointestinal: Denies N/V/C/D, abdominal pain, loss appetite, reflux, or hematochezia.  Genitourinary: Denies difficulty voiding, dysuria, nocturia, or hematuria.   Skin: Negative for rash or worrisome moles.   Neurological: Negative for dizziness and focal weakness. Migraines, see HPI.  Endo/Heme/Allergies: Denies bruise/bleed easily, allergies.   Psychiatric/Behavioral: Denies depression and difficulty sleeping. Anxiety, see HPI.  MSK: Hx of congential hip dysplasia, see HPI.    Objective:   Exam: /72 (BP Location: Left arm, Patient Position: Sitting, BP Cuff Size: Adult)   Pulse 81   Temp 36.9 °C (98.5 °F) (Temporal)   Resp 14   Ht 1.676 m (5' 6\")   Wt 73.2 kg (161 lb 6.4 oz)   SpO2 99%  Body mass index is 26.05 kg/m².    General: Normal appearing. No distress.  HEENT: Normocephalic. Eyes conjunctiva clear lids without ptosis, PERRLA, ears normal shape and contour, canals are clear bilaterally, tympanic membranes pearly gray, intact, non-bulging, no drainage noted. Oral and nasal examine deferred due to current COVID-19 outbreak, no acute concerns. Patient wore a mask during visit.  Neck: Supple without JVD or abnormal masses. Small soft mobile thyroid palpated, no nodules palpated, " non-tender.  Pulmonary: Clear to ausculation.  Normal effort. No rales, ronchi, or wheezing.  Cardiovascular: Regular rate and rhythm without murmur. Pedal and radial pulses are intact and equal bilaterally.  Abdomen: Soft, nontender, nondistended. Normal bowel sounds. Liver and spleen are not palpable.  Neurologic: Grossly non-focal.  Lymph: No cervical, submandibular, or supraclavicular lymph nodes are palpable.  Skin: Warm and dry.  No obvious lesions.  Musculoskeletal: Normal gait. No extremity cyanosis, clubbing, or edema. DTR+2  Psych: Normal mood and affect. Alert and oriented x3. Judgment and insight is normal.    Assessment & Plan:   1. Encounter to establish care with new doctor  Reviewed with patient medication and supplement use and side effects. Medical, past, surgical history reviewed with patient. Discussed with patient the risk and benefits of receiving vaccines. Discussed CDC recommendations for immunizations and USPSTF guidelines for screening exams.  Verbalized understanding. Encouraged patient to wash hands regularly and avoid sick contacts while supporting immune system.  Discussed the overall benefit of a well-balanced diet, regular exercise, and stress management, verbalized understanding.  Discussed preventive screening labs with patient, verbalized understanding and declined at this time. Instructed to RTC in 6 months for annual    2. Intrinsic eczema  This is a stable chronic condition.  Instructed to use prescribed steriod cream to areas that itch and/or are inflamed to prevent further itching of area.  Instructed to not use steroid cream for more than 10 to 14 days due to risk of thinning of skin.  Discussed a trial of Lanolin to dry skin, verbalized understanding and willingness. Instructed to apply to only one area for trial to assess for improvement. Instructed to stop if it causes further irration, verbalized understanding.  Instructed to use lotion such as Eucerin and/or Lubriderm  lotion on entire body as soon she is out of shower.    - alclomethasone (ACLOVATE) 0.05 % ointment; Apply 1 Application topically 2 times a day as needed. Do not use longer than 10 days in a row  Dispense: 45 g; Refill: 0    3. Generalized anxiety disorder  This is a chronic stable condition. Discussed and encouraged to practice breathing exercises when she identifies her anxiety increasing, verbalized understanding. Discussed the benefit of improving diet and exercise regimen,  verbalized understanding. Instructed to continue Prozac 20 mg daily. Reviewed with patient side effects of medication and how medication works.  Discussed seeking emergency services if she experiences worse symptoms.    - fluoxetine (PROZAC) 20 MG tablet; Take 1 tablet by mouth every day.  Dispense: 90 tablet; Refill: 3    4. Congenital hip dysplasia  This is a chronic stable condition. Instructed to notify PCP if she has any concerns. Declined referral to ortho at this time.     5. Migraines without aura and without status migrainosus, not intractable  This is a chronic stable condition.  Discussed preventative therapy and abortive therapies with patient. Instructed to take 400 mg of Magnesium daily as tolerated. Discussed starting at 100 mg and titrate to 400 mg if tolerated. Discussed the benefit and side effects of Magnesium. Discussed starting Riboflavins (B2) 400 mg daily to prevent migraines, verbalized understanding. Instructed to take 200 mg of OTC CoQ10 every evening.  Discussed the importance of diet and water intake to decrease severity and frequency of migraines.  Instructed to slowly decrease caffeine intake to avoid rebound migraine. Encouraged to do neck stretches regularly, and to practice mindfulness and meditation to help relieve and stress triggers. Discussed not taking NSAIDs more than 3 days in row due to rebound head potential when stopped, verbalized understanding. Discussed possible side effects of GI upset and GI  bleeding with long-term NSAID use, verbalized understanding. Instructed to take NSAIDs with food.  Instructed continue current reported regimen to abort migraines, verbalized understanding and willingness.     Return in about 6 months (around 12/28/2021) for Annual visit.    Please note that this dictation was created using voice recognition software. I have made every reasonable attempt to correct obvious errors, but I expect that there are errors of grammar and possibly content that I did not discover before finalizing the note.

## 2021-06-29 ENCOUNTER — TELEPHONE (OUTPATIENT)
Dept: MEDICAL GROUP | Facility: IMAGING CENTER | Age: 46
End: 2021-06-29

## 2021-06-29 PROBLEM — R22.0 LUMP ON FACE: Status: RESOLVED | Noted: 2017-09-28 | Resolved: 2021-06-29

## 2021-06-29 NOTE — ASSESSMENT & PLAN NOTE
Reports that she had surgery in 2013. States overall surgery improved able to function daily. States without surgery she was on her way being in a wheelchair. States due to history of right hip dysplasia she has anxiety due to multiple surgeries needed to correct. States she takes Prozac 20 mg daily due to anxiety related to health. States she has attempted to wean medication, but has not been successful to feeling more anxious on 10 mg daily. States she does intermittently decrease her dose due to feel it contributes to her intermittent anxiety. States she will treat her eczema with ointment and wean Prozac, once rash is resolved she will increase Prozac to 20 mg again. Denies any SI/HI. Denies any concerns at this time. States she maintains regular stretching and strengthen regimen for her hip.    Depression Screening  Little interest or pleasure in doing things?  0 - not at all  Feeling down, depressed , or hopeless? 0 - not at all  Patient Health Questionnaire Score: 0    If depressive symptoms identified deferred to follow up visit unless specifically addressed in assesment and plan.    Interpretation of PHQ-9 Total Score   Score Severity   1-4 Minimal Depression   5-9 Mild Depression   10-14 Moderate Depression   15-19 Moderately Severe Depression   20-27 Severe Depression

## 2021-06-29 NOTE — TELEPHONE ENCOUNTER
Please request last pap smear from Dr. Lyla Mcgrath at [facility].    Please request stat due to practice closing in July.  Sakina Becker, APRN-C

## 2022-05-05 ENCOUNTER — TELEPHONE (OUTPATIENT)
Dept: INTERNAL MEDICINE | Facility: IMAGING CENTER | Age: 47
End: 2022-05-05
Payer: COMMERCIAL

## 2022-08-31 ENCOUNTER — HOSPITAL ENCOUNTER (OUTPATIENT)
Dept: LAB | Facility: MEDICAL CENTER | Age: 47
End: 2022-08-31
Attending: FAMILY MEDICINE
Payer: COMMERCIAL

## 2022-09-01 ENCOUNTER — HOSPITAL ENCOUNTER (OUTPATIENT)
Dept: LAB | Facility: MEDICAL CENTER | Age: 47
End: 2022-09-01
Attending: FAMILY MEDICINE
Payer: COMMERCIAL

## 2022-09-01 ENCOUNTER — HOSPITAL ENCOUNTER (OUTPATIENT)
Dept: RADIOLOGY | Facility: MEDICAL CENTER | Age: 47
End: 2022-09-01
Attending: FAMILY MEDICINE
Payer: COMMERCIAL

## 2022-09-01 DIAGNOSIS — Z12.31 ENCOUNTER FOR SCREENING MAMMOGRAM FOR MALIGNANT NEOPLASM OF BREAST: ICD-10-CM

## 2022-09-01 LAB
ALBUMIN SERPL BCP-MCNC: 4.7 G/DL (ref 3.2–4.9)
ALBUMIN/GLOB SERPL: 1.7 G/DL
ALP SERPL-CCNC: 48 U/L (ref 30–99)
ALT SERPL-CCNC: 15 U/L (ref 2–50)
ANION GAP SERPL CALC-SCNC: 15 MMOL/L (ref 7–16)
AST SERPL-CCNC: 20 U/L (ref 12–45)
BILIRUB SERPL-MCNC: 0.2 MG/DL (ref 0.1–1.5)
BUN SERPL-MCNC: 22 MG/DL (ref 8–22)
CALCIUM SERPL-MCNC: 9.1 MG/DL (ref 8.5–10.5)
CHLORIDE SERPL-SCNC: 103 MMOL/L (ref 96–112)
CHOLEST SERPL-MCNC: 284 MG/DL (ref 100–199)
CO2 SERPL-SCNC: 19 MMOL/L (ref 20–33)
CREAT SERPL-MCNC: 0.74 MG/DL (ref 0.5–1.4)
ERYTHROCYTE [DISTWIDTH] IN BLOOD BY AUTOMATED COUNT: 43.5 FL (ref 35.9–50)
FASTING STATUS PATIENT QL REPORTED: NORMAL
GFR SERPLBLD CREATININE-BSD FMLA CKD-EPI: 101 ML/MIN/1.73 M 2
GLOBULIN SER CALC-MCNC: 2.7 G/DL (ref 1.9–3.5)
GLUCOSE SERPL-MCNC: 90 MG/DL (ref 65–99)
HCT VFR BLD AUTO: 41.7 % (ref 37–47)
HDLC SERPL-MCNC: 100 MG/DL
HGB BLD-MCNC: 14 G/DL (ref 12–16)
LDLC SERPL CALC-MCNC: 172 MG/DL
MCH RBC QN AUTO: 29.5 PG (ref 27–33)
MCHC RBC AUTO-ENTMCNC: 33.6 G/DL (ref 33.6–35)
MCV RBC AUTO: 88 FL (ref 81.4–97.8)
PLATELET # BLD AUTO: 345 K/UL (ref 164–446)
PMV BLD AUTO: 8.7 FL (ref 9–12.9)
POTASSIUM SERPL-SCNC: 4.1 MMOL/L (ref 3.6–5.5)
PROT SERPL-MCNC: 7.4 G/DL (ref 6–8.2)
RBC # BLD AUTO: 4.74 M/UL (ref 4.2–5.4)
SODIUM SERPL-SCNC: 137 MMOL/L (ref 135–145)
TRIGL SERPL-MCNC: 62 MG/DL (ref 0–149)
TSH SERPL DL<=0.005 MIU/L-ACNC: 1.64 UIU/ML (ref 0.38–5.33)
WBC # BLD AUTO: 9 K/UL (ref 4.8–10.8)

## 2022-09-01 PROCEDURE — 80053 COMPREHEN METABOLIC PANEL: CPT

## 2022-09-01 PROCEDURE — 77063 BREAST TOMOSYNTHESIS BI: CPT

## 2022-09-01 PROCEDURE — 84443 ASSAY THYROID STIM HORMONE: CPT

## 2022-09-01 PROCEDURE — 80061 LIPID PANEL: CPT

## 2022-09-01 PROCEDURE — 36415 COLL VENOUS BLD VENIPUNCTURE: CPT

## 2022-09-01 PROCEDURE — 85027 COMPLETE CBC AUTOMATED: CPT
